# Patient Record
(demographics unavailable — no encounter records)

---

## 2025-01-10 NOTE — DATA REVIEWED
[FreeTextEntry1] : I ordered, reviewed and interpreted today's audiometric testing myself and discussed the results with the family.  My interpretation is in keeping with:   Right ear: normal hearing, type A tympanogram Left ear: normal hearing, type As tympanogram

## 2025-01-10 NOTE — CONSULT LETTER
[FreeTextEntry2] : DR. JUAN LOPEZ [address] [place] [id number]  [FreeTextEntry3] : Alverto Baltazar MD Pediatric Otolaryngology [address] [address] Tel [phone] Fax [phone]

## 2025-01-10 NOTE — HISTORY OF PRESENT ILLNESS
[No Personal or Family History of Easy Bruising, Bleeding, or Issues with General Anesthesia] : No Personal or Family History of easy bruising, bleeding, or issues with general anesthesia [de-identified] : 3 y/o M presents for evaluation for recurrent ear infections  4 ear infections in past 6 months, none other in the past year Most recent one 12/24 with abx course.  Usually ear infections presents with otalgia.  No otorrhea, hearing loss or speech concerns.  No nasal congestion concerns.